# Patient Record
Sex: FEMALE | Race: BLACK OR AFRICAN AMERICAN | NOT HISPANIC OR LATINO | URBAN - METROPOLITAN AREA
[De-identification: names, ages, dates, MRNs, and addresses within clinical notes are randomized per-mention and may not be internally consistent; named-entity substitution may affect disease eponyms.]

---

## 2024-10-19 ENCOUNTER — EMERGENCY (EMERGENCY)
Facility: HOSPITAL | Age: 29
LOS: 1 days | Discharge: HOME | End: 2024-10-19
Attending: EMERGENCY MEDICINE | Admitting: EMERGENCY MEDICINE
Payer: COMMERCIAL

## 2024-10-19 VITALS
RESPIRATION RATE: 16 BRPM | HEART RATE: 72 BPM | OXYGEN SATURATION: 100 % | DIASTOLIC BLOOD PRESSURE: 74 MMHG | SYSTOLIC BLOOD PRESSURE: 114 MMHG

## 2024-10-19 VITALS
TEMPERATURE: 98 F | DIASTOLIC BLOOD PRESSURE: 75 MMHG | RESPIRATION RATE: 16 BRPM | SYSTOLIC BLOOD PRESSURE: 115 MMHG | HEIGHT: 64 IN | HEART RATE: 76 BPM | OXYGEN SATURATION: 100 % | WEIGHT: 160.06 LBS

## 2024-10-19 DIAGNOSIS — Z98.891 HISTORY OF UTERINE SCAR FROM PREVIOUS SURGERY: Chronic | ICD-10-CM

## 2024-10-19 LAB
ALBUMIN SERPL ELPH-MCNC: 4.1 G/DL — SIGNIFICANT CHANGE UP (ref 3.3–5)
ALP SERPL-CCNC: 63 U/L — SIGNIFICANT CHANGE UP (ref 40–120)
ALT FLD-CCNC: 8 U/L — SIGNIFICANT CHANGE UP (ref 4–33)
ANION GAP SERPL CALC-SCNC: 12 MMOL/L — SIGNIFICANT CHANGE UP (ref 7–14)
AST SERPL-CCNC: 12 U/L — SIGNIFICANT CHANGE UP (ref 4–32)
BILIRUB SERPL-MCNC: 0.6 MG/DL — SIGNIFICANT CHANGE UP (ref 0.2–1.2)
BUN SERPL-MCNC: 13 MG/DL — SIGNIFICANT CHANGE UP (ref 7–23)
CALCIUM SERPL-MCNC: 9.2 MG/DL — SIGNIFICANT CHANGE UP (ref 8.4–10.5)
CHLORIDE SERPL-SCNC: 105 MMOL/L — SIGNIFICANT CHANGE UP (ref 98–107)
CK SERPL-CCNC: 62 U/L — SIGNIFICANT CHANGE UP (ref 25–170)
CO2 SERPL-SCNC: 24 MMOL/L — SIGNIFICANT CHANGE UP (ref 22–31)
CREAT SERPL-MCNC: 0.65 MG/DL — SIGNIFICANT CHANGE UP (ref 0.5–1.3)
EGFR: 121 ML/MIN/1.73M2 — SIGNIFICANT CHANGE UP
GLUCOSE SERPL-MCNC: 108 MG/DL — HIGH (ref 70–99)
MAGNESIUM SERPL-MCNC: 2 MG/DL — SIGNIFICANT CHANGE UP (ref 1.6–2.6)
PHOSPHATE SERPL-MCNC: 3.3 MG/DL — SIGNIFICANT CHANGE UP (ref 2.5–4.5)
POTASSIUM SERPL-MCNC: 3.8 MMOL/L — SIGNIFICANT CHANGE UP (ref 3.5–5.3)
POTASSIUM SERPL-SCNC: 3.8 MMOL/L — SIGNIFICANT CHANGE UP (ref 3.5–5.3)
PROT SERPL-MCNC: 7.5 G/DL — SIGNIFICANT CHANGE UP (ref 6–8.3)
SODIUM SERPL-SCNC: 141 MMOL/L — SIGNIFICANT CHANGE UP (ref 135–145)

## 2024-10-19 PROCEDURE — 93970 EXTREMITY STUDY: CPT | Mod: 26

## 2024-10-19 PROCEDURE — 99284 EMERGENCY DEPT VISIT MOD MDM: CPT

## 2024-10-19 RX ORDER — ACETAMINOPHEN 325 MG
1000 TABLET ORAL ONCE
Refills: 0 | Status: COMPLETED | OUTPATIENT
Start: 2024-10-19 | End: 2024-10-19

## 2024-10-19 RX ORDER — SODIUM CHLORIDE 0.9 % (FLUSH) 0.9 %
1000 SYRINGE (ML) INJECTION ONCE
Refills: 0 | Status: COMPLETED | OUTPATIENT
Start: 2024-10-19 | End: 2024-10-19

## 2024-10-19 RX ADMIN — Medication 1000 MILLILITER(S): at 13:50

## 2024-10-19 RX ADMIN — Medication 400 MILLIGRAM(S): at 13:50

## 2024-10-19 NOTE — ED PROVIDER NOTE - NSFOLLOWUPINSTRUCTIONS_ED_ALL_ED_FT
You were seen in the emergency department today for your leg pain. Your testing here in the emergency department did not show any evidence of blood clots or electrolyte abnormalities.    For your pain, you can take 600mg ibuprofen every 6 hours or acetaminophen 650mg every 6 hours as needed for pain. If needed, you can alternate these medications so that you take one medication every 3 hours. For instance, at noon take ibuprofen, then at 3pm take acetaminophen, then at 6pm take ibuprofen.     Please schedule an appointment for follow-up with your primary care physician this week for further evaluation of your symptoms.    Return to the Emergency Department if you experience worsening back pain, difficulty walking, fevers, numbness, tingling, incontinence, or any other concerning symptoms.
Breath sounds clear and equal bilaterally.

## 2024-10-19 NOTE — ED PROVIDER NOTE - PHYSICAL EXAMINATION
VS:  unremarkable    GEN - mild discomfort legs pain, not in distress;   A+O x3   HEAD - NC/AT     ENT - PEERL, EOMI, mucous membranes    moist , no discharge      NECK: Neck supple, non-tender without lymphadenopathy, no masses, no JVD  PULM - CTA b/l,  symmetric breath sounds  COR -  normal heart sounds    ABD - , ND, NT, soft,  BACK - no CVA tenderness, nontender spine   No spinal ttp.    EXTREMS - no edema, no deformity, warm and well perfused  (+)mild calf ttp, no rash.    SKIN - no rash    or bruising      NEUROLOGIC - alert, face symmetric, speech fluent, sensation nl, motor no focal deficit.

## 2024-10-19 NOTE — ED PROVIDER NOTE - PATIENT PORTAL LINK FT
You can access the FollowMyHealth Patient Portal offered by [facility] by registering at the following website: [email or web address]. By joining [facility]’s FollowMyHealth portal, you will also be able to view your health information using other applications (apps) compatible with our system.

## 2024-10-19 NOTE — ED PROVIDER NOTE - CLINICAL SUMMARY MEDICAL DECISION MAKING FREE TEXT BOX
28F p/w bilat hip pain rad to thighs, calves and feet x 1 week a/w tingling and heavy feeling.  Legs feel tight.  Pain is persistent, tried some ibuprofen yesterday.  No fever /chills, no rash.  No CP/SOB.  Pt went to [place] in Sept but did not get sick there, no bug bites.  Never happened before.  No previous VTE, no h/o VTE in relatives.  Able to walk.  Denies back pain.  No trauma/injury.  VS wnl.  PMHX denies.  PSHX denies.  No meds (specifically no OCP.  No T.  NKDA.  On exam mild discomfort legs but not in distress, moving easily.  No LE edema.  Normal distal neuro exam of feet.  No midline spinal ttp.  No abd ttp.  Impression legs pain and heaviness, some cramps.  Plan check labs eval for electrolyte abnormality, rx tylenol, check duplex eval for DVT.  If all neg can d/c home f/u PMD.

## 2024-10-19 NOTE — ED ADULT NURSE NOTE - OBJECTIVE STATEMENT
Patient received intake 8, a&ox4. pt c/o BL hip pain radiating to legs x4 days. Pt states "the legs feel heavy, and they get tinglingly." pt denies medical hx, chest pain, sob, n+v, headache, dizziness, unsteady gait. Breathing even, unlabored. 20g IV placed to left ac, labs collected and sent. Pt medicated as per MAR. Safety maintained. Pending US.

## 2024-10-19 NOTE — ED PROVIDER NOTE - OBJECTIVE STATEMENT
28F p/w bilat hip pain rad to thighs, calves and feet x 1 week a/w tingling and heavy feeling.  Legs feel tight.  Pain is persistent, tried some ibuprofen yesterday.  No fever /chills, no rash.  No CP/SOB.  Pt went to [place] in Sept but did not get sick there, no bug bites.  Never happened before.  No previous VTE, no h/o VTE in relatives.  Able to walk.  Denies back pain.  No trauma/injury.  VS wnl.  PMHX denies.  PSHX denies.  No meds (specifically no OCP.  No T.  NKDA.  On exam mild discomfort legs but not in distress, moving easily.  No LE edema.  Normal distal neuro exam of feet.  No midline spinal ttp.  No abd ttp.  Impression legs pain and heaviness, some cramps.  Plan check labs eval for electrolyte abnormality, rx tylenol, check duplex eval for DVT.  If all neg can d/c home f/u PMD.    VS:  unremarkable    GEN - mild discomfort legs pain, not in distress;   A+O x3   HEAD - NC/AT     ENT - PEERL, EOMI, mucous membranes    moist , no discharge      NECK: Neck supple, non-tender without lymphadenopathy, no masses, no JVD  PULM - CTA b/l,  symmetric breath sounds  COR -  normal heart sounds    ABD - , ND, NT, soft,  BACK - no CVA tenderness, nontender spine   No spinal ttp.    EXTREMS - no edema, no deformity, warm and well perfused  (+)mild calf ttp, no rash.    SKIN - no rash    or bruising      NEUROLOGIC - alert, face symmetric, speech fluent, sensation nl, motor no focal deficit.

## 2024-10-19 NOTE — ED ADULT TRIAGE NOTE - CHIEF COMPLAINT QUOTE
c/o bilateral  hip pains radiating to legs x past  wk., describes as tightness to both legs with pins ,needles. denies injury.  states both legs feel heavy x past 4 days. denies headaches, visual disturbance

## 2024-10-19 NOTE — ED PROVIDER NOTE - ATTENDING CONTRIBUTION TO CARE
28F p/w bilat hip pain rad to thighs, calves and feet x 1 week a/w tingling and heavy feeling.  Legs feel tight.  Pain is persistent, tried some ibuprofen yesterday.  No fever /chills, no rash.  No CP/SOB.  Pt went to [place] in Sept but did not get sick there, no bug bites.  Never happened before.  No previous VTE, no h/o VTE in relatives.  Able to walk.  Denies back pain.  No trauma/injury.  VS wnl.  PMHX denies.  PSHX denies.  No meds (specifically no OCP.  No T.  NKDA.  On exam mild discomfort legs but not in distress, moving easily.  No LE edema.  Normal distal neuro exam of feet.  No midline spinal ttp.  No abd ttp.  Impression legs pain and heaviness, some cramps.  Plan check labs eval for electrolyte abnormality, rx tylenol, check duplex eval for DVT.  If all neg can d/c home f/u PMD.    VS:  unremarkable    GEN - mild discomfort legs pain, not in distress;   A+O x3   HEAD - NC/AT     ENT - PEERL, EOMI, mucous membranes    moist , no discharge      NECK: Neck supple, non-tender without lymphadenopathy, no masses, no JVD  PULM - CTA b/l,  symmetric breath sounds  COR -  normal heart sounds    ABD - , ND, NT, soft,  BACK - no CVA tenderness, nontender spine   No spinal ttp.    EXTREMS - no edema, no deformity, warm and well perfused  (+)mild calf ttp, no rash.    SKIN - no rash    or bruising      NEUROLOGIC - alert, face symmetric, speech fluent, sensation nl, motor no focal deficit.